# Patient Record
Sex: FEMALE | Race: BLACK OR AFRICAN AMERICAN | NOT HISPANIC OR LATINO | URBAN - METROPOLITAN AREA
[De-identification: names, ages, dates, MRNs, and addresses within clinical notes are randomized per-mention and may not be internally consistent; named-entity substitution may affect disease eponyms.]

---

## 2018-02-06 ENCOUNTER — INPATIENT (INPATIENT)
Facility: HOSPITAL | Age: 43
LOS: 0 days | Discharge: HOME | DRG: 392 | End: 2018-02-07
Attending: SURGERY | Admitting: SURGERY
Payer: COMMERCIAL

## 2018-02-06 VITALS
TEMPERATURE: 98 F | DIASTOLIC BLOOD PRESSURE: 75 MMHG | SYSTOLIC BLOOD PRESSURE: 121 MMHG | OXYGEN SATURATION: 99 % | HEART RATE: 100 BPM | RESPIRATION RATE: 17 BRPM

## 2018-02-06 LAB
ALBUMIN SERPL ELPH-MCNC: 3.3 G/DL — LOW (ref 3.4–5)
ALP SERPL-CCNC: 481 U/L — HIGH (ref 40–120)
ALT FLD-CCNC: 20 U/L — SIGNIFICANT CHANGE UP (ref 12–42)
ANION GAP SERPL CALC-SCNC: 13 MMOL/L — SIGNIFICANT CHANGE UP (ref 9–16)
APTT BLD: 37.8 SEC — HIGH (ref 27.5–36.5)
AST SERPL-CCNC: 198 U/L — HIGH (ref 15–37)
BASOPHILS NFR BLD AUTO: 0.3 % — SIGNIFICANT CHANGE UP (ref 0–2)
BILIRUB DIRECT SERPL-MCNC: 1 MG/DL — HIGH
BILIRUB SERPL-MCNC: 2.5 MG/DL — HIGH (ref 0.2–1.2)
BUN SERPL-MCNC: 5 MG/DL — LOW (ref 7–23)
CALCIUM SERPL-MCNC: 8.1 MG/DL — LOW (ref 8.5–10.5)
CHLORIDE SERPL-SCNC: 101 MMOL/L — SIGNIFICANT CHANGE UP (ref 96–108)
CO2 SERPL-SCNC: 25 MMOL/L — SIGNIFICANT CHANGE UP (ref 22–31)
CREAT SERPL-MCNC: 0.73 MG/DL — SIGNIFICANT CHANGE UP (ref 0.5–1.3)
EOSINOPHIL NFR BLD AUTO: 0.1 % — SIGNIFICANT CHANGE UP (ref 0–6)
GLUCOSE SERPL-MCNC: 75 MG/DL — SIGNIFICANT CHANGE UP (ref 70–99)
HCG SERPL-ACNC: <1 MIU/ML — SIGNIFICANT CHANGE UP
HCT VFR BLD CALC: 22.2 % — LOW (ref 34.5–45)
HGB BLD-MCNC: 6.7 G/DL — CRITICAL LOW (ref 11.5–15.5)
IMM GRANULOCYTES NFR BLD AUTO: 0.5 % — SIGNIFICANT CHANGE UP (ref 0–1.5)
INR BLD: 1.46 — HIGH (ref 0.88–1.16)
LACTATE SERPL-SCNC: 2.2 MMOL/L — HIGH (ref 0.4–2)
LACTATE SERPL-SCNC: 4.1 MMOL/L — CRITICAL HIGH (ref 0.4–2)
LIDOCAIN IGE QN: 233 U/L — SIGNIFICANT CHANGE UP (ref 73–393)
LYMPHOCYTES # BLD AUTO: 3 % — LOW (ref 13–44)
MCHC RBC-ENTMCNC: 27.9 PG — SIGNIFICANT CHANGE UP (ref 27–34)
MCHC RBC-ENTMCNC: 30.2 G/DL — LOW (ref 32–36)
MCV RBC AUTO: 92.5 FL — SIGNIFICANT CHANGE UP (ref 80–100)
MONOCYTES NFR BLD AUTO: 5.2 % — SIGNIFICANT CHANGE UP (ref 2–14)
NEUTROPHILS NFR BLD AUTO: 90.9 % — HIGH (ref 43–77)
OB PNL STL: NEGATIVE — SIGNIFICANT CHANGE UP
PLATELET # BLD AUTO: 76 K/UL — LOW (ref 150–400)
POTASSIUM SERPL-MCNC: 3.4 MMOL/L — LOW (ref 3.5–5.3)
POTASSIUM SERPL-SCNC: 3.4 MMOL/L — LOW (ref 3.5–5.3)
PROT SERPL-MCNC: 8.2 G/DL — SIGNIFICANT CHANGE UP (ref 6.4–8.2)
PROTHROM AB SERPL-ACNC: 16.2 SEC — HIGH (ref 9.8–12.7)
RBC # BLD: 2.4 M/UL — LOW (ref 3.8–5.2)
RBC # FLD: 27.3 % — HIGH (ref 10.3–16.9)
SODIUM SERPL-SCNC: 139 MMOL/L — SIGNIFICANT CHANGE UP (ref 132–145)
WBC # BLD: 14.9 K/UL — HIGH (ref 3.8–10.5)
WBC # FLD AUTO: 14.9 K/UL — HIGH (ref 3.8–10.5)

## 2018-02-06 PROCEDURE — 74177 CT ABD & PELVIS W/CONTRAST: CPT | Mod: 26

## 2018-02-06 PROCEDURE — 99285 EMERGENCY DEPT VISIT HI MDM: CPT

## 2018-02-06 RX ORDER — SODIUM CHLORIDE 9 MG/ML
1000 INJECTION, SOLUTION INTRAVENOUS
Qty: 0 | Refills: 0 | Status: DISCONTINUED | OUTPATIENT
Start: 2018-02-06 | End: 2018-02-07

## 2018-02-06 RX ORDER — MORPHINE SULFATE 50 MG/1
4 CAPSULE, EXTENDED RELEASE ORAL ONCE
Qty: 0 | Refills: 0 | Status: DISCONTINUED | OUTPATIENT
Start: 2018-02-06 | End: 2018-02-06

## 2018-02-06 RX ORDER — ONDANSETRON 8 MG/1
4 TABLET, FILM COATED ORAL EVERY 6 HOURS
Qty: 0 | Refills: 0 | Status: DISCONTINUED | OUTPATIENT
Start: 2018-02-06 | End: 2018-02-07

## 2018-02-06 RX ORDER — HYDROMORPHONE HYDROCHLORIDE 2 MG/ML
0.5 INJECTION INTRAMUSCULAR; INTRAVENOUS; SUBCUTANEOUS EVERY 4 HOURS
Qty: 0 | Refills: 0 | Status: DISCONTINUED | OUTPATIENT
Start: 2018-02-06 | End: 2018-02-07

## 2018-02-06 RX ORDER — HYDROMORPHONE HYDROCHLORIDE 2 MG/ML
1 INJECTION INTRAMUSCULAR; INTRAVENOUS; SUBCUTANEOUS ONCE
Qty: 0 | Refills: 0 | Status: DISCONTINUED | OUTPATIENT
Start: 2018-02-06 | End: 2018-02-06

## 2018-02-06 RX ORDER — HYDROMORPHONE HYDROCHLORIDE 2 MG/ML
1 INJECTION INTRAMUSCULAR; INTRAVENOUS; SUBCUTANEOUS EVERY 4 HOURS
Qty: 0 | Refills: 0 | Status: DISCONTINUED | OUTPATIENT
Start: 2018-02-06 | End: 2018-02-07

## 2018-02-06 RX ORDER — SODIUM CHLORIDE 9 MG/ML
2000 INJECTION INTRAMUSCULAR; INTRAVENOUS; SUBCUTANEOUS ONCE
Qty: 0 | Refills: 0 | Status: COMPLETED | OUTPATIENT
Start: 2018-02-06 | End: 2018-02-06

## 2018-02-06 RX ORDER — HEPARIN SODIUM 5000 [USP'U]/ML
5000 INJECTION INTRAVENOUS; SUBCUTANEOUS EVERY 8 HOURS
Qty: 0 | Refills: 0 | Status: DISCONTINUED | OUTPATIENT
Start: 2018-02-06 | End: 2018-02-07

## 2018-02-06 RX ORDER — SODIUM CHLORIDE 9 MG/ML
3 INJECTION INTRAMUSCULAR; INTRAVENOUS; SUBCUTANEOUS ONCE
Qty: 0 | Refills: 0 | Status: COMPLETED | OUTPATIENT
Start: 2018-02-06 | End: 2018-02-06

## 2018-02-06 RX ORDER — IOHEXOL 300 MG/ML
50 INJECTION, SOLUTION INTRAVENOUS ONCE
Qty: 0 | Refills: 0 | Status: COMPLETED | OUTPATIENT
Start: 2018-02-06 | End: 2018-02-06

## 2018-02-06 RX ADMIN — SODIUM CHLORIDE 1 MILLILITER(S): 9 INJECTION INTRAMUSCULAR; INTRAVENOUS; SUBCUTANEOUS at 16:03

## 2018-02-06 RX ADMIN — HYDROMORPHONE HYDROCHLORIDE 1 MILLIGRAM(S): 2 INJECTION INTRAMUSCULAR; INTRAVENOUS; SUBCUTANEOUS at 18:26

## 2018-02-06 RX ADMIN — MORPHINE SULFATE 4 MILLIGRAM(S): 50 CAPSULE, EXTENDED RELEASE ORAL at 16:23

## 2018-02-06 RX ADMIN — HYDROMORPHONE HYDROCHLORIDE 1 MILLIGRAM(S): 2 INJECTION INTRAMUSCULAR; INTRAVENOUS; SUBCUTANEOUS at 18:46

## 2018-02-06 RX ADMIN — HYDROMORPHONE HYDROCHLORIDE 1 MILLIGRAM(S): 2 INJECTION INTRAMUSCULAR; INTRAVENOUS; SUBCUTANEOUS at 21:24

## 2018-02-06 RX ADMIN — MORPHINE SULFATE 4 MILLIGRAM(S): 50 CAPSULE, EXTENDED RELEASE ORAL at 15:02

## 2018-02-06 RX ADMIN — SODIUM CHLORIDE 1 MILLILITER(S): 9 INJECTION INTRAMUSCULAR; INTRAVENOUS; SUBCUTANEOUS at 19:38

## 2018-02-06 RX ADMIN — IOHEXOL 50 MILLILITER(S): 300 INJECTION, SOLUTION INTRAVENOUS at 15:02

## 2018-02-06 RX ADMIN — SODIUM CHLORIDE 3 MILLILITER(S): 9 INJECTION INTRAMUSCULAR; INTRAVENOUS; SUBCUTANEOUS at 15:02

## 2018-02-06 RX ADMIN — MORPHINE SULFATE 4 MILLIGRAM(S): 50 CAPSULE, EXTENDED RELEASE ORAL at 16:32

## 2018-02-06 RX ADMIN — HYDROMORPHONE HYDROCHLORIDE 1 MILLIGRAM(S): 2 INJECTION INTRAMUSCULAR; INTRAVENOUS; SUBCUTANEOUS at 22:54

## 2018-02-06 RX ADMIN — MORPHINE SULFATE 4 MILLIGRAM(S): 50 CAPSULE, EXTENDED RELEASE ORAL at 16:30

## 2018-02-06 NOTE — ED PROVIDER NOTE - CARE PLAN
Principal Discharge DX:	Rectal inflammation  Secondary Diagnosis:	Anemia  Secondary Diagnosis:	LFTs abnormal

## 2018-02-06 NOTE — ED ADULT NURSE REASSESSMENT NOTE - NS ED NURSE REASSESS COMMENT FT1
Transfer of care from NICOLE Portillo. Pt is laying in bed, complaining of minimal relief from pain. Pt is waiting for admit to [facility]. Daughter is at bedside. Lab and CT results were reviewed. Will continue to monitor.

## 2018-02-06 NOTE — ED PROVIDER NOTE - ATTENDING CONTRIBUTION TO CARE
lower abdominal, rectal pain with constipation, RRR, CTAB, soft, NTND, rectal per PA exam, no c/c/e, AAOx3, neuro non-focal, labs with elevated WBC, anemia and throbocytopenia, elevated LFTs, CT without SBO, pericholecystic fluid but no RUQ tenderness or pain, admitted to surgery for further care

## 2018-02-06 NOTE — ED PROVIDER NOTE - MEDICAL DECISION MAKING DETAILS
43 y/o female with 3 days of rectal pain. Vital signs stable. Given history of gastric sleeve. Will consider possible SBO. Will do CT of the abd and pelvis, rule out SBO abd etiology and reaccess.

## 2018-02-06 NOTE — ED PROVIDER NOTE - GASTROINTESTINAL, MLM
Abdomen soft, non-tender, no guarding. Rectal pain with digital rectal exam; however no mass felt. No fluctuation. Soft water stool. No hard stool in the rectum. No fissures. No skin tags.

## 2018-02-06 NOTE — ED PROVIDER NOTE - PROGRESS NOTE DETAILS
Case discussed with Dr. Shankar. Will admit to regional surgery for stercoral cholitis. Discuss CT findings of possible acute cholitis. Pt however has upper right quadrant pain. Will do US in [facility] if needed. Arrange and discuss possible blood transfusion once at [facility]. Case discussed with Dr. Shankar. Will admit to regional surgery for stercoral cholitis. Discuss CT findings of possible acute cholecystitis Pt however has no RUQ  pain. Will do US in [facility] if needed. will arrange for possible blood transfusion once at [facility].

## 2018-02-06 NOTE — ED PROVIDER NOTE - OBJECTIVE STATEMENT
43 y/o denies pmh. S/o gastric sleeve in 2015. No complications post doc. Presents to the ED with 3 days of lower abd and rectal pain. Sensation of bowel movement. Unable to secondary to pain. Otherwise, denies N/V, and passing gas. No fevers or chills. No recent pain medications. No alterations in diet. Denies similar symptoms in the past. Was seen by [facility] today. Sent here for further evaluation.

## 2018-02-07 VITALS
RESPIRATION RATE: 18 BRPM | OXYGEN SATURATION: 96 % | SYSTOLIC BLOOD PRESSURE: 136 MMHG | TEMPERATURE: 98 F | DIASTOLIC BLOOD PRESSURE: 80 MMHG | HEART RATE: 91 BPM

## 2018-02-07 DIAGNOSIS — Z98.84 BARIATRIC SURGERY STATUS: Chronic | ICD-10-CM

## 2018-02-07 DIAGNOSIS — Z98.890 OTHER SPECIFIED POSTPROCEDURAL STATES: Chronic | ICD-10-CM

## 2018-02-07 LAB
ALBUMIN SERPL ELPH-MCNC: 3.1 G/DL — LOW (ref 3.3–5)
ALP SERPL-CCNC: 329 U/L — HIGH (ref 40–120)
ALT FLD-CCNC: 9 U/L — LOW (ref 10–45)
ANION GAP SERPL CALC-SCNC: 16 MMOL/L — SIGNIFICANT CHANGE UP (ref 5–17)
AST SERPL-CCNC: 82 U/L — HIGH (ref 10–40)
BILIRUB SERPL-MCNC: 2.2 MG/DL — HIGH (ref 0.2–1.2)
BLD GP AB SCN SERPL QL: NEGATIVE — SIGNIFICANT CHANGE UP
BLD GP AB SCN SERPL QL: NEGATIVE — SIGNIFICANT CHANGE UP
BUN SERPL-MCNC: 4 MG/DL — LOW (ref 7–23)
CALCIUM SERPL-MCNC: 8.1 MG/DL — LOW (ref 8.4–10.5)
CHLORIDE SERPL-SCNC: 98 MMOL/L — SIGNIFICANT CHANGE UP (ref 96–108)
CO2 SERPL-SCNC: 21 MMOL/L — LOW (ref 22–31)
CREAT SERPL-MCNC: 0.68 MG/DL — SIGNIFICANT CHANGE UP (ref 0.5–1.3)
GLUCOSE SERPL-MCNC: 83 MG/DL — SIGNIFICANT CHANGE UP (ref 70–99)
HCT VFR BLD CALC: 21.6 % — LOW (ref 34.5–45)
HGB BLD-MCNC: 6.2 G/DL — CRITICAL LOW (ref 11.5–15.5)
MAGNESIUM SERPL-MCNC: 1.1 MG/DL — LOW (ref 1.6–2.6)
MCHC RBC-ENTMCNC: 27.2 PG — SIGNIFICANT CHANGE UP (ref 27–34)
MCHC RBC-ENTMCNC: 28.7 G/DL — LOW (ref 32–36)
MCV RBC AUTO: 94.7 FL — SIGNIFICANT CHANGE UP (ref 80–100)
PHOSPHATE SERPL-MCNC: 2.6 MG/DL — SIGNIFICANT CHANGE UP (ref 2.5–4.5)
PLATELET # BLD AUTO: 67 K/UL — LOW (ref 150–400)
POTASSIUM SERPL-MCNC: 3.8 MMOL/L — SIGNIFICANT CHANGE UP (ref 3.5–5.3)
POTASSIUM SERPL-SCNC: 3.8 MMOL/L — SIGNIFICANT CHANGE UP (ref 3.5–5.3)
PROT SERPL-MCNC: 6.8 G/DL — SIGNIFICANT CHANGE UP (ref 6–8.3)
RBC # BLD: 2.28 M/UL — LOW (ref 3.8–5.2)
RBC # FLD: 28.3 % — HIGH (ref 10.3–16.9)
RH IG SCN BLD-IMP: POSITIVE — SIGNIFICANT CHANGE UP
RH IG SCN BLD-IMP: POSITIVE — SIGNIFICANT CHANGE UP
SODIUM SERPL-SCNC: 135 MMOL/L — SIGNIFICANT CHANGE UP (ref 135–145)
WBC # BLD: 14.5 K/UL — HIGH (ref 3.8–10.5)
WBC # FLD AUTO: 14.5 K/UL — HIGH (ref 3.8–10.5)

## 2018-02-07 PROCEDURE — 80053 COMPREHEN METABOLIC PANEL: CPT

## 2018-02-07 PROCEDURE — 84100 ASSAY OF PHOSPHORUS: CPT

## 2018-02-07 PROCEDURE — 86901 BLOOD TYPING SEROLOGIC RH(D): CPT

## 2018-02-07 PROCEDURE — 96375 TX/PRO/DX INJ NEW DRUG ADDON: CPT

## 2018-02-07 PROCEDURE — G0378: CPT

## 2018-02-07 PROCEDURE — 36415 COLL VENOUS BLD VENIPUNCTURE: CPT

## 2018-02-07 PROCEDURE — 85027 COMPLETE CBC AUTOMATED: CPT

## 2018-02-07 PROCEDURE — 74177 CT ABD & PELVIS W/CONTRAST: CPT

## 2018-02-07 PROCEDURE — 99285 EMERGENCY DEPT VISIT HI MDM: CPT | Mod: 25

## 2018-02-07 PROCEDURE — 86900 BLOOD TYPING SEROLOGIC ABO: CPT

## 2018-02-07 PROCEDURE — 86923 COMPATIBILITY TEST ELECTRIC: CPT

## 2018-02-07 PROCEDURE — 86850 RBC ANTIBODY SCREEN: CPT

## 2018-02-07 PROCEDURE — 83735 ASSAY OF MAGNESIUM: CPT

## 2018-02-07 PROCEDURE — 96374 THER/PROPH/DIAG INJ IV PUSH: CPT | Mod: XU

## 2018-02-07 PROCEDURE — 96376 TX/PRO/DX INJ SAME DRUG ADON: CPT

## 2018-02-07 RX ORDER — ACETAMINOPHEN 500 MG
975 TABLET ORAL ONCE
Qty: 0 | Refills: 0 | Status: COMPLETED | OUTPATIENT
Start: 2018-02-07 | End: 2018-02-07

## 2018-02-07 RX ORDER — DOCUSATE SODIUM 100 MG
1 CAPSULE ORAL
Qty: 30 | Refills: 0 | OUTPATIENT
Start: 2018-02-07 | End: 2018-02-16

## 2018-02-07 RX ORDER — FERROUS SULFATE 325(65) MG
1 TABLET ORAL
Qty: 0 | Refills: 0 | COMMUNITY

## 2018-02-07 RX ORDER — PIPERACILLIN AND TAZOBACTAM 4; .5 G/20ML; G/20ML
3.38 INJECTION, POWDER, LYOPHILIZED, FOR SOLUTION INTRAVENOUS EVERY 6 HOURS
Qty: 0 | Refills: 0 | Status: DISCONTINUED | OUTPATIENT
Start: 2018-02-07 | End: 2018-02-07

## 2018-02-07 RX ORDER — POTASSIUM CHLORIDE 20 MEQ
40 PACKET (EA) ORAL ONCE
Qty: 0 | Refills: 0 | Status: COMPLETED | OUTPATIENT
Start: 2018-02-07 | End: 2018-02-07

## 2018-02-07 RX ADMIN — Medication 1 ENEMA: at 04:19

## 2018-02-07 RX ADMIN — HYDROMORPHONE HYDROCHLORIDE 1 MILLIGRAM(S): 2 INJECTION INTRAMUSCULAR; INTRAVENOUS; SUBCUTANEOUS at 01:29

## 2018-02-07 RX ADMIN — Medication 975 MILLIGRAM(S): at 11:08

## 2018-02-07 RX ADMIN — SODIUM CHLORIDE 130 MILLILITER(S): 9 INJECTION, SOLUTION INTRAVENOUS at 07:59

## 2018-02-07 RX ADMIN — HEPARIN SODIUM 5000 UNIT(S): 5000 INJECTION INTRAVENOUS; SUBCUTANEOUS at 08:01

## 2018-02-07 RX ADMIN — Medication 975 MILLIGRAM(S): at 12:00

## 2018-02-07 RX ADMIN — HYDROMORPHONE HYDROCHLORIDE 1 MILLIGRAM(S): 2 INJECTION INTRAMUSCULAR; INTRAVENOUS; SUBCUTANEOUS at 01:45

## 2018-02-07 RX ADMIN — PIPERACILLIN AND TAZOBACTAM 200 GRAM(S): 4; .5 INJECTION, POWDER, LYOPHILIZED, FOR SOLUTION INTRAVENOUS at 08:01

## 2018-02-07 RX ADMIN — SODIUM CHLORIDE 130 MILLILITER(S): 9 INJECTION, SOLUTION INTRAVENOUS at 00:14

## 2018-02-07 RX ADMIN — PIPERACILLIN AND TAZOBACTAM 200 GRAM(S): 4; .5 INJECTION, POWDER, LYOPHILIZED, FOR SOLUTION INTRAVENOUS at 13:54

## 2018-02-07 NOTE — PROVIDER CONTACT NOTE (OTHER) - ACTION/TREATMENT ORDERED:
Pt refused tap water enema at this time. Pt stated "I just had a BM. I'll take it later if I can't have one on my own".

## 2018-02-07 NOTE — PATIENT PROFILE ADULT. - VISION (WITH CORRECTIVE LENSES IF THE PATIENT USUALLY WEARS THEM):
uses reading glasses/Normal vision: sees adequately in most situations; can see medication labels, newsprint

## 2018-02-07 NOTE — PROVIDER CONTACT NOTE (CRITICAL VALUE NOTIFICATION) - NS PROVIDER READ BACK
yes/paged Team 4 at [phone] but no response, so I text paged team 4 via [employer] ([phone])

## 2018-02-07 NOTE — DISCHARGE NOTE ADULT - MEDICATION SUMMARY - MEDICATIONS TO TAKE
I will START or STAY ON the medications listed below when I get home from the hospital:    ferrous sulfate 325 mg (65 mg elemental iron) oral tablet  -- 1 tab(s) by mouth 3 times a day  -- Indication: For Home med    Multiple Vitamins oral capsule  -- 1 cap(s) by mouth once a day  -- Indication: For Home med

## 2018-02-07 NOTE — H&P ADULT - HISTORY OF PRESENT ILLNESS
42 F with PMHx of anemia PSHx of LSG (2015), breast reduction (2008) presented to [facility] for rectal pain, nausea and bloating x1 day. At baseline she is regular with 1 BM/ DAY, her last bowel movement was 4 days ago and normal. She reports constipation 1-2 times per year which she treats with prune juice, she tried no intervention with this episode. This morning she found herself to have rectal pain, bloating and nausea, which progress through the day prompting her presentation to [facility]. She denies Hx of blood AK, change in stool color, hemorrhoids, prior rectal instrumentation, colonoscopy, IBD, family Hx of colon ca. She denies vomiting, decreased appetite fever/ chills, diarrhea, SOB, CP.    Upon presentation to [facility] she was afebrile, slightly tachycardic, with otherwise normal VS. Labs were significant for WBC of 14.9 with 90% neutrophils, H/H 6.7/22.2, Lactate 4.1->2.2, tbili 2.5, ALK Phos 481, , ALT 20. CT was remarkable for Large amount of stool causes abnormal rectal dilatation, with rectal wall thickening and infiltration of the perirectal fat, consistent with stercoral colitis with possible pneumatosis within the rectal wall v stool adjacent to wall. Small ascites. and multiple gallstones within distended gallbladder. Pericholecystic fluid present. Markedly enlarged, fatty liver. Splenomegaly. Patchy areas of ground-glass opacity lingula and left lower lobe.   She was subsequently transferred to [facility] for further evaluation and treatment.

## 2018-02-07 NOTE — PROVIDER CONTACT NOTE (OTHER) - ASSESSMENT
Potassium chloride 40meQ was given to pt as per order for K+ level=3.4. However pt vomited what she consumed from the cup. Pt's sister stated that "sometimes she vomits because she can't eat/drink too much ever since her sleeve gastrectomy".

## 2018-02-07 NOTE — PROGRESS NOTE ADULT - ASSESSMENT
42 F transferred from [facility] for constipation x3 days and rectal pain, nausea, and bloating x1 day. She was found to have elevated WBC to 14.9 with 90% pmn H/H 6.7/22.2, lactate  4.1, tbili 2.5, Alk Phos 481, , ALT of 20 and CT findings suggestive of acute cholecystitis and stercoral colitis.      -Pain/ nausea control PRN  -diet  -discharge  -zosyn  -am labs

## 2018-02-07 NOTE — DISCHARGE NOTE ADULT - PATIENT PORTAL LINK FT
You can access the [facility] Patient Portal, offered by [facility], by registering with the following website: [email or web address]

## 2018-02-07 NOTE — PROVIDER CONTACT NOTE (OTHER) - ASSESSMENT
Fleet enema was ordered but pt just had a large formed and liquid BM. Asked if fleet enema should still be given.

## 2018-02-07 NOTE — DISCHARGE NOTE ADULT - CARE PLAN
Principal Discharge DX:	Rectal inflammation  Goal:	Continue having BMs  Assessment and plan of treatment:	You do not have to follow up with a surgeon. You may continue your regular diet and your daily activities.  Continue taking stool softener which was sent to your pharmacy, Take as prescribed.  Secondary Diagnosis:	Anemia  Secondary Diagnosis:	LFTs abnormal

## 2018-02-07 NOTE — PROVIDER CONTACT NOTE (OTHER) - ACTION/TREATMENT ORDERED:
Dr. Rapp stated to offer fleet enema to pt but pt refused at this time. Pt stated "I want to wait if I can go again on my own".

## 2018-02-07 NOTE — H&P ADULT - ASSESSMENT
42 F transferred from [facility] for constipation x3 days and rectal pain, nausea, and bloating x1 day. She was found to have elevated WBC to 14.9 with 90% pmn H/H 6.7/22.2, lactate  4.1, tbili 2.5, Alk Phos 481, , ALT of 20 and CT findings suggestive of acute cholecystitis and stercoral colitis.      -admit to regional  -Pain/ nausea control PRN  -transfuse 1 u PRBC  -NPO/ IVF  -fleet enema  -zosyn  -am labs  -Plan discussed with chief resident and Dr Siddiqi

## 2018-02-07 NOTE — DISCHARGE NOTE ADULT - PLAN OF CARE
Continue having BMs You do not have to follow up with a surgeon. You may continue your regular diet and your daily activities.  Continue taking stool softener which was sent to your pharmacy, Take as prescribed.

## 2018-02-07 NOTE — PROVIDER CONTACT NOTE (OTHER) - ASSESSMENT
Pt has arrived to [address] [place] [address] rm 931 from [facility]. Pt complains of pain level "20/10" in anus. Pt also wanted to eat. Pt has arrived to [address] [place] [address] rm 931 from [facility]. Pt complained of pain level "20/10" in anus. Pt also wanted to eat.

## 2018-02-07 NOTE — H&P ADULT - NSHPLABSRESULTS_GEN_ALL_CORE
6.7    14.9  )-----------( 76       ( 06 Feb 2018 15:04 )             22.2   02-06    139  |  101  |  5<L>  ----------------------------<  75  3.4<L>   |  25  |  0.73    Ca    8.1<L>      06 Feb 2018 15:04    TPro  8.2  /  Alb  3.3   /  TBili  2.5   /  DBili  1.0<H>  /  AST  198   /  ALT  20  /  AlkPhos  481   02-06    < from: CT Abdomen and Pelvis w/ Oral Cont and w/ IV Cont (02.06.18 @ 18:31) >    FINDINGS: Images of the lower chest demonstrate patchy areas of   groundglass opacity in the lingula and left lower lobe.    The liver is markedly enlarged, measuring 27.0 cm in craniocaudad   dimension. Diffuse decreased attenuation of liver parenchyma is   consistent with hepatic steatosis. No liver masses are seen. No biliary   ductal dilatation. Multiple small gallstones are present within a   distended gallbladder. Gallbladder wall not thickened. There is   splenomegaly, with the spleen measuring 13.7 cm in greatest dimension. No   focal splenic lesions. Pancreas, adrenal glands, and kidneys unremarkable.    There is small ascites, with perihepatic, pericholecystic, and   perisplenic ascites. There is no lymphadenopathy in abdomen or pelvis.    Examination of gastrointestinal tract demonstrates patient to be status   post sleeve gastrectomy. Unremarkable small bowel. Large amount of stool   causes abnormal rectal dilatation to 7.0 cm. Rectal wall is thickened and   there is infiltration of the perirectal fat. These findings are   consistent with stercoral colitis. There may be small foci of gas within   the rectal wall, versus stool adjacent to the rectal wall. No abscess or   free air.    The uterus is enlarged and contains multiple masses, consistent with a   fibroid uterus. Ovaries unremarkable.    Minimal degenerative changes of spine.    IMPRESSION: 1. Large amount of stool causes abnormal rectal dilatation,   with rectal wall thickening and infiltration of the perirectal fat,   consistent with stercoral colitis. There could be pneumatosis within the   rectal wall.    2. Small ascites.    3. Multiple gallstones within distended gallbladder. Pericholecystic   fluid present. These findings could be seen with acute cholecystitis.    4. Patchy areas of groundglass opacity lingula and left lower lobe. The   differential diagnosis includes infection and aspiration.    5. Markedly enlarged, fatty liver.    6. Splenomegaly.    7. Enlarged, leiomyomatous uterus.    < end of copied text >

## 2018-02-07 NOTE — PROVIDER CONTACT NOTE (OTHER) - ASSESSMENT
Pt complained of bilateral plantar numbness that started "a couple of days ago". Left plantar is more numb than the right plantar. Positive sensation.

## 2018-02-07 NOTE — H&P ADULT - NSHPPHYSICALEXAM_GEN_ALL_CORE
Vital Signs Last 24 Hrs  T(C): 36.8 (07 Feb 2018 00:24), Max: 37.3 (06 Feb 2018 17:38)  T(F): 98.2 (07 Feb 2018 00:24), Max: 99.1 (06 Feb 2018 17:38)  HR: 95 (07 Feb 2018 00:24) (91 - 105)  BP: 138/84 (07 Feb 2018 00:24) (121/75 - 141/81)  BP(mean): --  RR: 18 (07 Feb 2018 00:24) (16 - 18)  SpO2: 94% (07 Feb 2018 00:24) (94% - 99%)    General: A/O x4 appears mildly uncomfortable  CV: RRR  Resp: normal work of breathing on RA  ABD: obese, soft, Tender in LLQ and RUQ, no peritoneal signs.   Rectal: condylomata acuminata, appropriate sphincter tone, hard stool in vault, unable to palpate rectal walls. no blood to gloved finger.

## 2018-02-07 NOTE — DISCHARGE NOTE ADULT - HOSPITAL COURSE
42 F with PMHx of anemia PSHx of LSG (2015), breast reduction (2008) presented to [facility] for rectal pain, nausea and bloating x1 day. At baseline she is regular with 1 BM/ DAY, her last bowel movement was 4 days ago and normal. She reports constipation 1-2 times per year which she treats with prune juice, she tried no intervention with this episode. This morning she found herself to have rectal pain, bloating and nausea, which progress through the day prompting her presentation to [facility]. She denies Hx of blood CO, change in stool color, hemorrhoids, prior rectal instrumentation, colonoscopy, IBD, family Hx of colon ca. She denies vomiting, decreased appetite fever/ chills, diarrhea, SOB, CP.  Patient was given multiple enemas with return of multiple bowel movements. CT shows cholelithiasis, LFT were elevated but trended down, but patient is asymptomatic. Abdominal exam is completely benign. Patient last Hgb is 6.2- patient refused any transfusion and stated she does have chronic anemia. Patient tolerated PO intake and voided adequately. At time of discharge patient was stable.

## 2018-02-07 NOTE — DISCHARGE NOTE ADULT - VISION (WITH CORRECTIVE LENSES IF THE PATIENT USUALLY WEARS THEM):
Normal vision: sees adequately in most situations; can see medication labels, newsprint/uses reading glasses

## 2018-02-07 NOTE — PROGRESS NOTE ADULT - SUBJECTIVE AND OBJECTIVE BOX
2/6 ON: Admitted for stercoral colitis. Refused transfusion. Large BM        42 F transferred from [facility] for constipation x3 days and rectal pain, nausea, and bloating x1 day. She was found to have elevated WBC to 14.9 with 90% pmn H/H 6.7/22.2, lactate  4.1, tbili 2.5, Alk Phos 481, , ALT of 20 and CT findings suggestive of acute cholecystitis and stercoral colitis.      -admit to regional  -Pain/ nausea control PRN  -NPO/ IVF  -zosyn  -am labs SUBJECTIVE: Patient seen and examined bedside by chief resident. 2/6 ON: Admitted for stercoral colitis. Refused transfusion. Large BM    Vital Signs Last 24 Hrs  T(C): 36.9 (07 Feb 2018 08:48), Max: 37.3 (06 Feb 2018 17:38)  T(F): 98.5 (07 Feb 2018 08:48), Max: 99.1 (06 Feb 2018 17:38)  HR: 87 (07 Feb 2018 08:48) (87 - 105)  BP: 134/86 (07 Feb 2018 08:48) (121/75 - 141/81)  BP(mean): --  RR: 19 (07 Feb 2018 08:48) (16 - 19)  SpO2: 95% (07 Feb 2018 08:48) (94% - 99%)  I&O's Detail    06 Feb 2018 07:01  -  07 Feb 2018 07:00  --------------------------------------------------------  IN:    lactated ringers.: 1040 mL  Total IN: 1040 mL    OUT:    Voided: 600 mL  Total OUT: 600 mL    Total NET: 440 mL        General: NAD, resting comfortably in bed  Pulm: Nonlabored breathing, no respiratory distress  Abd: soft, NT/ND.        LABS:                        6.2    14.5  )-----------( 67       ( 07 Feb 2018 06:52 )             21.6     02-07    135  |  98  |  4<L>  ----------------------------<  83  3.8   |  21<L>  |  0.68    Ca    8.1<L>      07 Feb 2018 06:51  Phos  2.6     02-07  Mg     1.1     02-07    TPro  6.8  /  Alb  3.1<L>  /  TBili  2.2<H>  /  DBili  x   /  AST  82<H>  /  ALT  9<L>  /  AlkPhos  329<H>  02-07    PT/INR - ( 06 Feb 2018 15:04 )   PT: 16.2 sec;   INR: 1.46          PTT - ( 06 Feb 2018 15:04 )  PTT:37.8 sec      RADIOLOGY & ADDITIONAL STUDIES:

## 2018-02-12 DIAGNOSIS — R79.89 OTHER SPECIFIED ABNORMAL FINDINGS OF BLOOD CHEMISTRY: ICD-10-CM

## 2018-02-12 DIAGNOSIS — Z28.21 IMMUNIZATION NOT CARRIED OUT BECAUSE OF PATIENT REFUSAL: ICD-10-CM

## 2018-02-12 DIAGNOSIS — K59.00 CONSTIPATION, UNSPECIFIED: ICD-10-CM

## 2018-02-12 DIAGNOSIS — K52.89 OTHER SPECIFIED NONINFECTIVE GASTROENTERITIS AND COLITIS: ICD-10-CM

## 2018-02-12 DIAGNOSIS — D64.9 ANEMIA, UNSPECIFIED: ICD-10-CM

## 2018-02-12 DIAGNOSIS — K81.0 ACUTE CHOLECYSTITIS: ICD-10-CM

## 2021-11-09 ENCOUNTER — APPOINTMENT (RX ONLY)
Dept: URBAN - METROPOLITAN AREA CLINIC 15 | Facility: CLINIC | Age: 46
Setting detail: DERMATOLOGY
End: 2021-11-09

## 2021-11-09 VITALS — HEIGHT: 70 IN | WEIGHT: 194 LBS

## 2021-11-09 DIAGNOSIS — L81.0 POSTINFLAMMATORY HYPERPIGMENTATION: ICD-10-CM

## 2021-11-09 DIAGNOSIS — L90.6 STRIAE ATROPHICAE: ICD-10-CM

## 2021-11-09 DIAGNOSIS — L82.1 OTHER SEBORRHEIC KERATOSIS: ICD-10-CM

## 2021-11-09 PROCEDURE — 99203 OFFICE O/P NEW LOW 30 MIN: CPT

## 2021-11-09 PROCEDURE — ? COUNSELING

## 2021-11-09 PROCEDURE — ? ADDITIONAL NOTES

## 2021-11-09 PROCEDURE — ? PRESCRIPTION MEDICATION MANAGEMENT

## 2021-11-09 ASSESSMENT — LOCATION DETAILED DESCRIPTION DERM
LOCATION DETAILED: RIGHT LATERAL ABDOMEN
LOCATION DETAILED: RIGHT INFERIOR CENTRAL MALAR CHEEK
LOCATION DETAILED: LEFT CENTRAL MALAR CHEEK
LOCATION DETAILED: LEFT SUPERIOR ANTERIOR NECK
LOCATION DETAILED: RIGHT SUPERIOR ANTERIOR NECK
LOCATION DETAILED: LEFT SUPERIOR LATERAL NECK
LOCATION DETAILED: RIGHT SUPERIOR LATERAL NECK

## 2021-11-09 ASSESSMENT — LOCATION SIMPLE DESCRIPTION DERM
LOCATION SIMPLE: LEFT ANTERIOR NECK
LOCATION SIMPLE: LEFT CHEEK
LOCATION SIMPLE: ABDOMEN
LOCATION SIMPLE: RIGHT CHEEK
LOCATION SIMPLE: RIGHT ANTERIOR NECK

## 2021-11-09 ASSESSMENT — LOCATION ZONE DERM
LOCATION ZONE: TRUNK
LOCATION ZONE: FACE
LOCATION ZONE: NECK

## 2021-11-09 NOTE — HPI: DISCOLORATION
How Severe Is Your Skin Discoloration?: moderate
Additional History: Patient is in office for discoloration on face that started about four month ago.

## 2021-11-09 NOTE — PROCEDURE: PRESCRIPTION MEDICATION MANAGEMENT
Initiate Treatment: .\\n\\nAM\\n- Wash face with Gentle Cleanser every morning. \\n- Apply EltaMD UV Clear to face daily, highly recommended. \\n\\nPM\\n- w2\\n- Apply Universal Bleacheze  cream (Hydroquinone 8% with Retionic Acid 0.025% and Desonide 0.05% ) only at night time.
Detail Level: Zone
Render In Strict Bullet Format?: No

## 2021-11-09 NOTE — PROCEDURE: ADDITIONAL NOTES
Additional Notes: [id number], [place], [place] [address]\\[phone].
Render Risk Assessment In Note?: no
Detail Level: Simple

## 2022-01-11 ENCOUNTER — APPOINTMENT (RX ONLY)
Dept: URBAN - METROPOLITAN AREA CLINIC 15 | Facility: CLINIC | Age: 47
Setting detail: DERMATOLOGY
End: 2022-01-11

## 2022-01-11 VITALS — HEIGHT: 70 IN | WEIGHT: 194 LBS

## 2022-01-11 DIAGNOSIS — L81.0 POSTINFLAMMATORY HYPERPIGMENTATION: ICD-10-CM

## 2022-01-11 DIAGNOSIS — L82.0 INFLAMED SEBORRHEIC KERATOSIS: ICD-10-CM

## 2022-01-11 DIAGNOSIS — L82.1 OTHER SEBORRHEIC KERATOSIS: ICD-10-CM

## 2022-01-11 PROCEDURE — ? BENIGN DESTRUCTION

## 2022-01-11 PROCEDURE — ? PRESCRIPTION MEDICATION MANAGEMENT

## 2022-01-11 PROCEDURE — 17111 DESTRUCTION B9 LESIONS 15/>: CPT

## 2022-01-11 PROCEDURE — ? COUNSELING

## 2022-01-11 PROCEDURE — 99213 OFFICE O/P EST LOW 20 MIN: CPT | Mod: 25

## 2022-01-11 ASSESSMENT — LOCATION DETAILED DESCRIPTION DERM
LOCATION DETAILED: RIGHT SUPERIOR CENTRAL MALAR CHEEK
LOCATION DETAILED: RIGHT SUPERIOR LATERAL MALAR CHEEK
LOCATION DETAILED: RIGHT CHIN
LOCATION DETAILED: LEFT INFERIOR MEDIAL MALAR CHEEK
LOCATION DETAILED: LEFT INFERIOR CENTRAL MALAR CHEEK
LOCATION DETAILED: RIGHT CENTRAL MALAR CHEEK
LOCATION DETAILED: RIGHT LOWER CUTANEOUS LIP
LOCATION DETAILED: LEFT LATERAL MALAR CHEEK
LOCATION DETAILED: RIGHT INFERIOR CENTRAL MALAR CHEEK
LOCATION DETAILED: LEFT CENTRAL MALAR CHEEK

## 2022-01-11 ASSESSMENT — LOCATION ZONE DERM
LOCATION ZONE: LIP
LOCATION ZONE: FACE

## 2022-01-11 ASSESSMENT — LOCATION SIMPLE DESCRIPTION DERM
LOCATION SIMPLE: RIGHT CHEEK
LOCATION SIMPLE: CHIN
LOCATION SIMPLE: LEFT CHEEK
LOCATION SIMPLE: RIGHT LIP

## 2022-01-11 NOTE — PROCEDURE: BENIGN DESTRUCTION
Anesthesia Volume In Cc: 0.5
Detail Level: Detailed
Render Note In Bullet Format When Appropriate: No
Post-Care Instructions: I reviewed with the patient in detail post-care instructions. Patient is to wear sunprotection, and avoid picking at any of the treated lesions. Pt may apply Vaseline to crusted or scabbing areas.
Medical Necessity Information: It is in your best interest to select a reason for this procedure from the list below. All of these items fulfill various CMS LCD requirements except the new and changing color options.
Treatment Number (Will Not Render If 0): 0
Consent: The patient's consent was obtained including but not limited to risks of crusting, scabbing, blistering, scarring, darker or lighter pigmentary change, recurrence, incomplete removal and infection.
Medical Necessity Clause: This procedure was medically necessary because the lesions that were treated were:

## 2022-01-11 NOTE — PROCEDURE: PRESCRIPTION MEDICATION MANAGEMENT
Continue Regimen: .\\n\\nAM\\n- Wash face with Gentle Cleanser every morning. \\n- Apply EltaMD UV Clear to face daily, highly recommended. \\n\\nPM\\n- Wash face with gentle cleanser \\n- Apply Universal Bleacheze  cream (Hydroquinone 8% with Retionic Acid 0.025% and Desonide 0.05% ) only at night time.
Detail Level: Zone
Render In Strict Bullet Format?: No
Initiate Treatment: .\\n\\nApply antibiotic oinmemt once a day for 7 days.

## 2022-02-25 ENCOUNTER — APPOINTMENT (RX ONLY)
Dept: URBAN - METROPOLITAN AREA CLINIC 15 | Facility: CLINIC | Age: 47
Setting detail: DERMATOLOGY
End: 2022-02-25

## 2022-02-25 VITALS — HEIGHT: 70 IN | WEIGHT: 194 LBS

## 2022-02-25 DIAGNOSIS — L81.0 POSTINFLAMMATORY HYPERPIGMENTATION: ICD-10-CM

## 2022-02-25 DIAGNOSIS — L65.9 NONSCARRING HAIR LOSS, UNSPECIFIED: ICD-10-CM

## 2022-02-25 PROCEDURE — ? PRESCRIPTION MEDICATION MANAGEMENT

## 2022-02-25 PROCEDURE — 99213 OFFICE O/P EST LOW 20 MIN: CPT

## 2022-02-25 PROCEDURE — ? COUNSELING

## 2022-02-25 PROCEDURE — ? PRESCRIPTION

## 2022-02-25 RX ORDER — FLUOCINONIDE 0.5 MG/ML
1 SOLUTION TOPICAL TIW
Qty: 60 | Refills: 2 | Status: ERX | COMMUNITY
Start: 2022-02-25

## 2022-02-25 RX ADMIN — FLUOCINONIDE 1: 0.5 SOLUTION TOPICAL at 00:00

## 2022-02-25 ASSESSMENT — LOCATION ZONE DERM
LOCATION ZONE: FACE
LOCATION ZONE: SCALP

## 2022-02-25 ASSESSMENT — LOCATION SIMPLE DESCRIPTION DERM
LOCATION SIMPLE: LEFT CHEEK
LOCATION SIMPLE: RIGHT CHEEK
LOCATION SIMPLE: LEFT SCALP

## 2022-02-25 ASSESSMENT — LOCATION DETAILED DESCRIPTION DERM
LOCATION DETAILED: LEFT MEDIAL FRONTAL SCALP
LOCATION DETAILED: RIGHT INFERIOR CENTRAL MALAR CHEEK
LOCATION DETAILED: LEFT CENTRAL MALAR CHEEK

## 2022-02-25 NOTE — PROCEDURE: PRESCRIPTION MEDICATION MANAGEMENT
Continue Regimen: .\\n\\nAM\\n- Wash face with Gentle Cleanser every morning. \\n- Apply EltaMD UV Clear to face daily, highly recommended. \\n\\nPM\\n- Wash face with gentle cleanser \\n- Apply Universal Bleacheze  cream (Hydroquinone 8% with Retionic Acid 0.025% and Desonide 0.05% ) only at night time.
Detail Level: Zone
Render In Strict Bullet Format?: No
Initiate Treatment: .\\n\\n- fluocinonide 0.05 % topical solution Apply and massage into the scalp three days a week. Monday, Wednesday, and Friday.

## 2022-10-05 PROBLEM — Z00.00 ENCOUNTER FOR PREVENTIVE HEALTH EXAMINATION: Status: ACTIVE | Noted: 2022-10-05

## 2025-02-19 NOTE — PROVIDER CONTACT NOTE (OTHER) - NAME OF MD/NP/PA/DO NOTIFIED:
HELLEN Eaton
Ubaldo Rapp MD
2:05 PM   Result Value Ref Range    PH, VENOUS (POC) 6.86 (LL) 7.32 - 7.42      PCO2, Nelly, POC >110.0 (H) 41 - 51 MMHG    PO2, VENOUS (POC) <27 25 - 40 mmHg    POC Sodium 149 (H) 136 - 145 MMOL/L    POC Potassium 3.7 3.5 - 5.5 MMOL/L    POC Chloride 104 100 - 111 MMOL/L    Anion Gap, POC Cannot be calculated 10 - 20      POC Glucose 337 (H) 74 - 99 MG/DL    POC Creatinine 1.1 0.6 - 1.3 MG/DL    eGFR, POC 73 >60 ml/min/1.73m2    POC Ionized Calcium 1.30 1.15 - 1.33 mmol/L    POC Lactic Acid 13.05 (HH) 0.40 - 2.00 mmol/L    Source VENOUS BLOOD      Critical Value Read Back SMITH        EKG: If performed, independent interpretation documented below in the MDM section     RADIOLOGY:  Non-plain film images such as CT, Ultrasound and MRI are read by the radiologist. Plain radiographic images are visualized and preliminarily interpreted by the ED Provider with the findings documented in the MDM section.     Interpretation per the Radiologist below, if available at the time of this note:     No orders to display        PROCEDURES   Unless otherwise noted below, none  Procedures     CRITICAL CARE TIME   0    EMERGENCY DEPARTMENT COURSE and DIFFERENTIAL DIAGNOSIS/MDM   Vitals:  There were no vitals filed for this visit.     Patient was given the following medications:  Medications   EPINEPHrine (EPINEPHrine HCL) 1 mg/10 mL injection (has no administration in time range)   sodium bicarbonate 8.4 % injection (has no administration in time range)   EPINEPHrine 1 MG/10ML injection (1 mg IntraVENous Given 2/19/25 1404)   sodium bicarbonate 8.4 % injection (50 mEq IntraVENous Given 2/19/25 1358)       Medical Decision Making  Amount and/or Complexity of Data Reviewed  Discussion of management or test interpretation with external provider(s): The patient's family is requesting autopsy.  I called our pathology department, and they said they do not do autopsies unless they are at the request of a physician.  I spoke to the medical